# Patient Record
Sex: MALE | Race: BLACK OR AFRICAN AMERICAN | Employment: UNEMPLOYED | URBAN - METROPOLITAN AREA
[De-identification: names, ages, dates, MRNs, and addresses within clinical notes are randomized per-mention and may not be internally consistent; named-entity substitution may affect disease eponyms.]

---

## 2018-06-07 ENCOUNTER — OFFICE VISIT (OUTPATIENT)
Dept: UROLOGY | Age: 58
End: 2018-06-07

## 2018-06-07 VITALS
SYSTOLIC BLOOD PRESSURE: 151 MMHG | OXYGEN SATURATION: 96 % | HEIGHT: 76 IN | BODY MASS INDEX: 29.47 KG/M2 | HEART RATE: 65 BPM | DIASTOLIC BLOOD PRESSURE: 80 MMHG | WEIGHT: 242 LBS

## 2018-06-07 DIAGNOSIS — R35.0 FREQUENT URINATION: Primary | ICD-10-CM

## 2018-06-07 DIAGNOSIS — N52.01 ERECTILE DYSFUNCTION DUE TO ARTERIAL INSUFFICIENCY: ICD-10-CM

## 2018-06-07 DIAGNOSIS — R35.1 NOCTURIA: ICD-10-CM

## 2018-06-07 LAB
BILIRUB UR QL STRIP: NEGATIVE
GLUCOSE UR-MCNC: NEGATIVE MG/DL
KETONES P FAST UR STRIP-MCNC: NEGATIVE MG/DL
PH UR STRIP: 6 [PH] (ref 4.6–8)
PROT UR QL STRIP: NEGATIVE
SP GR UR STRIP: 1.02 (ref 1–1.03)
UA UROBILINOGEN AMB POC: NORMAL (ref 0.2–1)
URINALYSIS CLARITY POC: CLEAR
URINALYSIS COLOR POC: YELLOW
URINE BLOOD POC: NEGATIVE
URINE LEUKOCYTES POC: NEGATIVE
URINE NITRITES POC: NEGATIVE

## 2018-06-07 RX ORDER — BUPRENORPHINE AND NALOXONE 8; 2 MG/1; MG/1
1 FILM, SOLUBLE BUCCAL; SUBLINGUAL 2 TIMES DAILY
COMMUNITY
End: 2021-10-22

## 2018-06-07 RX ORDER — TAMSULOSIN HYDROCHLORIDE 0.4 MG/1
0.4 CAPSULE ORAL DAILY
Qty: 30 CAP | Refills: 0 | Status: SHIPPED | OUTPATIENT
Start: 2018-06-07 | End: 2018-08-04 | Stop reason: SDUPTHER

## 2018-06-07 RX ORDER — SILDENAFIL 100 MG/1
100 TABLET, FILM COATED ORAL AS NEEDED
Qty: 30 TAB | Refills: 10 | Status: SHIPPED | OUTPATIENT
Start: 2018-06-07 | End: 2021-02-04

## 2018-06-07 NOTE — MR AVS SNAPSHOT
[facility] [address] [address] 
[phone] Patient: Fernando Leary MRN: [id number] KEB:[birth year] Visit Information Date & Time Provider Department Dept. Phone Encounter #  
 6/7/2018 11:15 AM Cody Ledbetter [facility] [phone] [id number] Your Appointments 6/28/2018 11:15 AM  
Office Visit with Cameron Torres MD  
[facility] [address] [place] [address]) Appt Note: checkup [address] [place]amed A [address] [address]  
[phone] [address] [address] Upcoming Health Maintenance Date Due Hepatitis C Screening 1960 DTaP/Tdap/Td series (1 - Tdap) 7/23/1980 FOBT Q 1 YEAR AGE 50-75 7/23/2009 Influenza Age 5 to Adult 8/1/2018 Allergies as of 6/7/2018  Review Complete On: 6/7/2018 By: Tania Strauss No Known Allergies Current Immunizations  Never Reviewed No immunizations on file. Not reviewed this visit You Were Diagnosed With   
  
 Codes Comments Frequent urination    -  Primary ICD-10-CM: R35.0 ICD-9-CM: 788.41 Erectile dysfunction, unspecified erectile dysfunction type     ICD-10-CM: N52.9 ICD-9-CM: 607.84 Nocturia     ICD-10-CM: R35.1 ICD-9-CM: 788.43 Vitals BP Pulse Height(growth percentile) Weight(growth percentile) SpO2 BMI  
 151/80 (BP 1 Location: Left arm, BP Patient Position: Sitting) 65 6' 4\" (1.93 m) 242 lb (109.8 kg) 96% 29.46 kg/m2 Smoking Status Never Smoker Vitals History BMI and BSA Data Body Mass Index Body Surface Area  
 29.46 kg/m 2 2.43 m 2 Preferred Pharmacy Pharmacy Name Phone [id number], [phone] [place] [phone] Your Updated Medication List  
  
   
This list is accurate as of 6/7/18 12:46 PM.  Always use your most recent med list.  
  
  
 ibuprofen 600 mg tablet Commonly known as:  MOTRIN Take 1 Tab by mouth every six (6) hours as needed for Pain. lactobacillus rhamnosus gg 10 billion cell 10 billion cell capsule Commonly known as:  PROBIOTIC Take 1 Cap by mouth daily. oxyCODONE-acetaminophen 7.5-325 mg per tablet Commonly known as:  PERCOCET Take 1 Tab by mouth every six (6) hours as needed for Pain. Max Daily Amount: 4 Tabs.  
  
 sildenafil citrate 100 mg tablet Commonly known as:  VIAGRA Take 1 Tab by mouth as needed. Indications: Erectile Dysfunction SUBOXONE 8-2 mg Film sublingaul film Generic drug:  buprenorphine-naloxone  
by SubLINGual route daily. Prescriptions Printed Refills  
 sildenafil citrate (VIAGRA) 100 mg tablet 10 Sig: Take 1 Tab by mouth as needed. Indications: Erectile Dysfunction Class: Print Route: Oral  
  
We Performed the Following AMB POC URINALYSIS DIP STICK AUTO W/O MICRO [95466 CPT(R)] ND SCOTT,POST-VOID RES,US,NON-IMAGING [id number] CPT(R)] Introducing [facility]! Bettina Sheffield introduces [employer] patient portal. Now you can access parts of your medical record, email your doctor's office, and request medication refills online. 1. In your internet browser, go to [email or web address] 2. Click on the First Time User? Click Here link in the Sign In box. You will see the New Member Sign Up page. 3. Enter your [employer] Access Code exactly as it appears below. You will not need to use this code after youve completed the sign-up process. If you do not sign up before the expiration date, you must request a new code. · [employer] Access Code: [email or web address] Expires: 9/5/2018 11:45 AM 
 
4. Enter the last four digits of your Social Security Number (xxxx) and Date of Birth (mm/dd/yyyy) as indicated and click Submit. You will be taken to the next sign-up page. 5. Create a [employer] ID. This will be your [employer] login ID and cannot be changed, so think of one that is secure and easy to remember. 6. Create a [employer] password. You can change your password at any time. 7. Enter your Password Reset Question and Answer. This can be used at a later time if you forget your password. 8. Enter your e-mail address. You will receive e-mail notification when new information is available in [address] 9. Click Sign Up. You can now view and download portions of your medical record. 10. Click the Download Summary menu link to download a portable copy of your medical information. If you have questions, please visit the Frequently Asked Questions section of the [employer] website. Remember, [employer] is NOT to be used for urgent needs. For medical emergencies, dial 911. Now available from your iPhone and Android! Please provide this summary of care documentation to your next provider. Your primary care clinician is listed as NONE. If you have any questions after today's visit, please call [phone].

## 2018-06-07 NOTE — PROGRESS NOTES
MrGeraldine Davenport has a reminder for a \"due or due soon\" health maintenance. I have asked that he contact his primary care provider for follow-up on this health maintenance.

## 2018-06-07 NOTE — PROGRESS NOTES
Chief Complaint   Patient presents with    New Patient    Urinary Frequency    Nocturia    Erectile Dysfunction       HISTORY OF PRESENT ILLNESS:  Choco Wang is a 61 y.o. male who presents today with a history going back couple of years at least of very pronounced urinary frequency and especially worsening nocturia which is currently at anywhere from 2-4 times nightly. He has no episodes of incontinence but he does have severe urgency and actually carries a cup around with him in the car. He has not had any infections, hematuria, episodes of retention and he has not on any medication for that. His other chief complaint is also that of significant erectile dysfunction which she has had for about a year or so. He is able to have erections to the point of intercourse but not very often. He states that his father who is 80 and is still active as a  let him try 1 of his (dad's) Viagra and it worked very well. Documented on the chart. He has no systemic or metabolic illnesses that would predispose him to erectile dysfunction other than some mild degenerative back disease. ROS    Past Medical History:   Diagnosis Date    Back pain     Back problem        Past Surgical History:   Procedure Laterality Date    HX ORTHOPAEDIC      bilateral shoulder surgery    HX ORTHOPAEDIC      cyst removed from left wrist       Social History   Substance Use Topics    Smoking status: Never Smoker    Smokeless tobacco: Never Used    Alcohol use No       No Known Allergies    Family History   Problem Relation Age of Onset    Family history unknown: Yes       Current Outpatient Prescriptions   Medication Sig Dispense Refill    buprenorphine-naloxone (SUBOXONE) 8-2 mg film sublingaul film by SubLINGual route daily.  sildenafil citrate (VIAGRA) 100 mg tablet Take 1 Tab by mouth as needed.  Indications: Erectile Dysfunction 30 Tab 10    tamsulosin (FLOMAX) 0.4 mg capsule Take 1 Cap by mouth daily. 30 Cap 0    oxyCODONE-acetaminophen (PERCOCET) 7.5-325 mg per tablet Take 1 Tab by mouth every six (6) hours as needed for Pain. Max Daily Amount: 4 Tabs. 20 Tab 0    ibuprofen (MOTRIN) 600 mg tablet Take 1 Tab by mouth every six (6) hours as needed for Pain. 20 Tab 0    lactobacillus rhamnosus gg 10 billion cell (PROBIOTIC) 10 billion cell capsule Take 1 Cap by mouth daily. 14 Cap 0         PHYSICAL EXAMINATION:   Visit Vitals    /80 (BP 1 Location: Left arm, BP Patient Position: Sitting)    Pulse 65    Ht 6' 4\" (1.93 m)    Wt 242 lb (109.8 kg)    SpO2 96%    BMI 29.46 kg/m2     Constitutional: WDWN, Pleasant and appropriate affect, No acute distress. CV:  No peripheral swelling noted  Respiratory: No respiratory distress or difficulties  Abdomen:  No abdominal masses or tenderness. No CVA tenderness. No inguinal hernias noted.  Male:    ALLY:Perineum normal to visual inspection, no erythema or irritation, Sphincter with good tone, Rectum with no hemorrhoids, fissures or masses, the prostate seems very small for his age but on the other hand he is extremely so tense that I really could not get a good evaluation of the entire prostate. SCROTUM:  No scrotal rash or lesions noticed. Normal bilateral testes and epididymis. PENIS: Urethral meatus normal in location and size. No urethral discharge. He is uncircumcised and I do not feel any plaques of Peyronie's disease. Skin: No jaundice. Neuro/Psych:  Alert and oriented x 3, affect appropriate. Lymphatic:   No enlarged inguinal lymph nodes.          Results for orders placed or performed in visit on 06/07/18   AMB POC URINALYSIS DIP STICK AUTO W/O MICRO   Result Value Ref Range    Color (UA POC) Yellow     Clarity (UA POC) Clear     Glucose (UA POC) Negative Negative    Bilirubin (UA POC) Negative Negative    Ketones (UA POC) Negative Negative    Specific gravity (UA POC) 1.025 1.001 - 1.035    Blood (UA POC) Negative Negative pH (UA POC) 6.0 4.6 - 8.0    Protein (UA POC) Negative Negative    Urobilinogen (UA POC) 0.2 mg/dL 0.2 - 1    Nitrites (UA POC) Negative Negative    Leukocyte esterase (UA POC) Negative Negative     Bladder scan shows less than 30 cc residual.    REVIEW OF LABS AND IMAGING:     Imaging Report Reviewed? YES     Images Reviewed? YES           Other Lab Data Reviewed? YES         ASSESSMENT:     ICD-10-CM ICD-9-CM    1. Frequent urination R35.0 788.41 AMB POC URINALYSIS DIP STICK AUTO W/O MICRO      DC SCOTT,POST-VOID RES,US,NON-IMAGING      tamsulosin (FLOMAX) 0.4 mg capsule   2. Erectile dysfunction due to arterial insufficiency N52.01 607.84 AMB POC URINALYSIS DIP STICK AUTO W/O MICRO   3. Nocturia R35.1 788.43 AMB POC URINALYSIS DIP STICK AUTO W/O MICRO      DC SCOTT,POST-VOID RES,US,NON-IMAGING      tamsulosin (FLOMAX) 0.4 mg capsule            PLAN / DISCUSSION: : I think he probably does have some bladder outlet obstructive issues although probably not significant enough to cause elevated residual.  For that reason, I am going to empirically put him on some Flomax for about a month and see how he does with that. We then had a lengthy talk about the treatment of his erection issues which I think could probably be corrected with Viagra. I have written him a prescription for Viagra along with the store numbers to have that filled. I am also going to get a urinary cytology although he is a non-smoker because of his dysuria and urinary discomfort. The patient expresses understanding and agreement of the discussion and plan. Leo Arora MD on 6/7/2018         Please note: This document has been produced using voice recognition software. Unrecognized errors in transcription may be present.

## 2018-06-11 LAB — CYTOLOGY URINE,28053: NORMAL

## 2018-08-04 DIAGNOSIS — R35.0 FREQUENT URINATION: ICD-10-CM

## 2018-08-04 DIAGNOSIS — R35.1 NOCTURIA: ICD-10-CM

## 2018-08-07 RX ORDER — TAMSULOSIN HYDROCHLORIDE 0.4 MG/1
CAPSULE ORAL
Qty: 30 CAP | Refills: 0 | Status: SHIPPED | OUTPATIENT
Start: 2018-08-07 | End: 2021-09-23 | Stop reason: SDUPTHER

## 2019-01-29 ENCOUNTER — OFFICE VISIT (OUTPATIENT)
Dept: UROLOGY | Age: 59
End: 2019-01-29

## 2019-01-29 VITALS
SYSTOLIC BLOOD PRESSURE: 115 MMHG | HEIGHT: 76 IN | BODY MASS INDEX: 26.3 KG/M2 | DIASTOLIC BLOOD PRESSURE: 64 MMHG | WEIGHT: 216 LBS | HEART RATE: 64 BPM | OXYGEN SATURATION: 95 %

## 2019-01-29 DIAGNOSIS — R35.0 URINARY FREQUENCY: Primary | ICD-10-CM

## 2019-01-29 DIAGNOSIS — Z12.5 PROSTATE CANCER SCREENING: ICD-10-CM

## 2019-01-29 LAB
BILIRUB UR QL STRIP: NEGATIVE
GLUCOSE UR-MCNC: NEGATIVE MG/DL
KETONES P FAST UR STRIP-MCNC: NEGATIVE MG/DL
PH UR STRIP: 6 [PH] (ref 4.6–8)
PROT UR QL STRIP: NEGATIVE
SP GR UR STRIP: 1.01 (ref 1–1.03)
UA UROBILINOGEN AMB POC: NORMAL (ref 0.2–1)
URINALYSIS CLARITY POC: CLEAR
URINALYSIS COLOR POC: YELLOW
URINE BLOOD POC: NEGATIVE
URINE LEUKOCYTES POC: NORMAL
URINE NITRITES POC: NEGATIVE

## 2019-01-29 RX ORDER — TAMSULOSIN HYDROCHLORIDE 0.4 MG/1
CAPSULE ORAL
Qty: 60 CAP | Refills: 11 | Status: SHIPPED | OUTPATIENT
Start: 2019-01-29 | End: 2019-04-12

## 2019-01-29 NOTE — PROGRESS NOTES
NARRATIVE: 
he is a pleasant 58-year-old -American male who is known to this office with a single first visit in June 2018. The patient came in with frequency urgency and nocturia without incontinence. The patient has also been consuming a significant amount of coffee. The patient was given Flomax and it worked very well but the patient ran out and did not get refills. The patient also has ongoing concerns because the use of Viagra is still somewhat uncertain. The patient is able to get an erection but not sustain the erection. The patient's father has prostate cancer but the patient himself is not getting PSA determinations Bladder scan reveals 118 cc residual urine with the patient being off Flomax. Urine is negative IMPRESSION: 
Recurrence of urinary frequency that was well controlled on Flomax Erectile dysfunction probable venous leak phenomenon Family history prostate cancer PLAN: 
I am going to refill the Flomax and will write to increase it to 2.8 mg/day after food I discussed the use of a venous constriction ring and the proper consumption of Viagra to maximize effectiveness ASA will be drawn and I will call that patient with that report and he is strongly advised that he needs to get into the habit of yearly rectal exam and PSA This visit exceeded 15 minutes and  >50% was discussion Patient expresses understanding of the treatment plan and wishes to proceed This dictation used voice recognition software and there may be mistakes.  
 
Victorino Freeman MD

## 2019-01-30 ENCOUNTER — DOCUMENTATION ONLY (OUTPATIENT)
Dept: UROLOGY | Age: 59
End: 2019-01-30

## 2019-01-30 ENCOUNTER — TELEPHONE (OUTPATIENT)
Dept: UROLOGY | Age: 59
End: 2019-01-30

## 2019-01-30 LAB — PSA SERPL-MCNC: 7 NG/ML (ref 0–4)

## 2019-01-30 NOTE — TELEPHONE ENCOUNTER
I called  Thor Springer per Dr. Lily Davis. I informed . Thor Springer to not have his Viagra pres filled because Dr. Lily Davis wants him to return to the office for discussions regarding his PSA. Patient was given an appointment for !-31-19 AT 3:30PM.  Patient understood and  agreed.

## 2019-01-31 ENCOUNTER — OFFICE VISIT (OUTPATIENT)
Dept: UROLOGY | Age: 59
End: 2019-01-31

## 2019-01-31 ENCOUNTER — DOCUMENTATION ONLY (OUTPATIENT)
Dept: UROLOGY | Age: 59
End: 2019-01-31

## 2019-01-31 VITALS
HEART RATE: 79 BPM | WEIGHT: 216 LBS | DIASTOLIC BLOOD PRESSURE: 53 MMHG | BODY MASS INDEX: 26.3 KG/M2 | HEIGHT: 76 IN | SYSTOLIC BLOOD PRESSURE: 100 MMHG | OXYGEN SATURATION: 94 %

## 2019-01-31 DIAGNOSIS — R97.20 ELEVATED PSA: Primary | ICD-10-CM

## 2019-01-31 RX ORDER — CIPROFLOXACIN 500 MG/1
TABLET ORAL
Qty: 3 TAB | Refills: 0 | Status: SHIPPED | OUTPATIENT
Start: 2019-01-31 | End: 2019-04-12

## 2019-01-31 RX ORDER — SULFAMETHOXAZOLE AND TRIMETHOPRIM 800; 160 MG/1; MG/1
TABLET ORAL
Qty: 3 TAB | Refills: 0 | Status: SHIPPED | OUTPATIENT
Start: 2019-01-31 | End: 2019-04-12

## 2019-01-31 NOTE — PROGRESS NOTES
Mr. Joselo Hurley has a reminder for a \"due or due soon\" health maintenance. I have asked that he contact his primary care provider for follow-up on this health maintenance.

## 2019-01-31 NOTE — PATIENT INSTRUCTIONS
Prostate-Specific Antigen (PSA) Test: About This Test  What is it? A prostate-specific antigen (PSA) test measures the amount of PSA in your blood. PSA is released by a man's prostate gland into his blood. A high PSA level may mean that you have an enlargement, infection, or cancer of the prostate. Why is this test done? You may have this test to:  · Check for prostate cancer. · Watch prostate cancer and see if treatment is working. How can you prepare for the test?  Do not ejaculate during the 2 days before your PSA blood test, either during sex or masturbation. What happens before the test?  Tell your doctor if you have had a:  · Test to look at your bladder (cystoscopy) in the past several weeks. · Prostate biopsy in the past several weeks. · Prostate infection or urinary tract infection that has not gone away. · Tube (catheter) inserted into your bladder to drain urine recently. What happens during the test?  A health professional takes a sample of your blood. What happens after the test?  You can go back to your usual activities right away. When should you call for help? Watch closely for changes in your health, and be sure to contact your doctor if you have any questions about this test.  Follow-up care is a key part of your treatment and safety. Be sure to make and go to all appointments, and call your doctor if you are having problems. It's also a good idea to keep a list of the medicines you take. Ask your doctor when you can expect to have your test results. Where can you learn more? Go to http://cosme-celine.info/. Enter M290 in the search box to learn more about \"Prostate-Specific Antigen (PSA) Test: About This Test.\"  Current as of: March 27, 2018  Content Version: 11.9  © 2634-0313 [employer]. Care instructions adapted under license by [employer] (which disclaims liability or warranty for this information).  If you have questions about a medical condition or this instruction, always ask your healthcare professional. [place] [address] any warranty or liability for your use of this information.

## 2019-01-31 NOTE — PROGRESS NOTES
NARRATIVE:  The patient is a 58-year-old -American male who comes back to the office of my request because of a PSA elevated at 7.0 with a family history of prostate cancer in the father and no previous PSA surveillance                      IMPRESSION: Elevated PSA and -American male with family history of prostate cancer        PLAN: Extended discussion about PSA and the causes for its elevation. The implications in the -American community especially with a family history is reviewed. I have discussed alternatives with the patient but I believe his purposes are best served by proceeding on with transrectal ultrasound to establish prostate size PSA density and internal anatomy. The patient will be prepared to undergo biopsy if needed. The patient is aware of the preparation technique and convalescence. He is aware that he needs to take his medication as prescribed. The patient is aware of the risks that include, but are not limited to, infection, sepsis, bleeding, finding of cancer, failure to diagnose, and possible need for additional procedures      This visit exceeded 15 minutes and greater than 50% was counseling. The patient expresses understanding of the treatment plan and wishes to proceed    This dictation used voice recognition software and there may be mistakes.     Jesica Gamino MD

## 2019-01-31 NOTE — PROGRESS NOTES
Dr. Mekhi Marinelli had given  a prescription for Sildenafil 100 mg to [facility] to [facility] . He had taken the script to [facility] and it needed a Authorization , . Then he had taken the prescription to [facility]. I called the Pharmacy they said they don't have the medication and could not get it that all [facility] do not supply this medication . I called his Insurance for a Authorization they said it was not covered. I told her He has already called and his insurance company said it was covered. I was then given another number to call to speak to a other Rep. She told me it is covered under his plan and tell the Pharmacy to us [phone] as a NDC number . [facility] they said they had the medication so I called Mr. Destiny Gallegos to tell him [facility] was unable to fill his medication because they don\"T cover this drug at all. I told him [facility] has this drug and ask him if he wanted me to call this in for him he said yes. I called [facility] back she tried to run the medication with the [facility] number that was given to me but something was wrong with his insurance card, I told her I would have to call her back. I called Mr. Destiny Gallegos back to see if he had a drug card ,he said he had a saving's card. I did not call the pharmacy back because at that time Dr. Mekhi Marinelli came in . His blood work had come back in and his PSA was elevated . Dr. Mekhi Marinelli said he needs to come in to see him and put the Sildenafil on hold until he come's in the office for a talk. Appointment was given ,he will be in today for follow up. Susi Hunt

## 2019-02-07 ENCOUNTER — DOCUMENTATION ONLY (OUTPATIENT)
Dept: UROLOGY | Age: 59
End: 2019-02-07

## 2019-02-07 NOTE — PROGRESS NOTES
Mr. Will Connolly call to see if their is any thing else we can give him foe ED . I told her I would have to talk with Dr. Pro Vargas and call he back. I returned her call after speaking with Dr. Pro Vargas he said we are done with this situation . We have spent a lot of time with this Patient and his insurance company. She told me he had paid 58 dollars out of pocket and told her we are sorry but their was nothing liu we were going to do. She also told me this medication will not be paid through his insurance company.

## 2019-02-12 ENCOUNTER — DOCUMENTATION ONLY (OUTPATIENT)
Dept: UROLOGY | Age: 59
End: 2019-02-12

## 2019-02-12 NOTE — PROGRESS NOTES
Mr. Ben Rush called and wanted a refill on his  Viagra . He filled one prescription on January 30 for 20 tabs . I called and told him he has 11 refills. He said he did not know.

## 2019-04-12 PROBLEM — F11.21 NARCOTIC DEPENDENCE, IN REMISSION (HCC): Status: ACTIVE | Noted: 2019-04-12

## 2022-06-09 ENCOUNTER — APPOINTMENT (OUTPATIENT)
Dept: GENERAL RADIOLOGY | Age: 62
End: 2022-06-09
Attending: PHYSICIAN ASSISTANT
Payer: MEDICAID

## 2022-06-09 ENCOUNTER — HOSPITAL ENCOUNTER (EMERGENCY)
Age: 62
Discharge: HOME | End: 2022-06-09
Attending: STUDENT IN AN ORGANIZED HEALTH CARE EDUCATION/TRAINING PROGRAM
Payer: MEDICAID

## 2022-06-09 VITALS
HEIGHT: 75 IN | HEART RATE: 57 BPM | OXYGEN SATURATION: 99 % | BODY MASS INDEX: 26.36 KG/M2 | RESPIRATION RATE: 18 BRPM | SYSTOLIC BLOOD PRESSURE: 104 MMHG | TEMPERATURE: 98.1 F | WEIGHT: 212 LBS | DIASTOLIC BLOOD PRESSURE: 60 MMHG

## 2022-06-09 DIAGNOSIS — M79.631 RIGHT FOREARM PAIN: Primary | ICD-10-CM

## 2022-06-09 DIAGNOSIS — M79.641 RIGHT HAND PAIN: ICD-10-CM

## 2022-06-09 PROCEDURE — 99283 EMERGENCY DEPT VISIT LOW MDM: CPT

## 2022-06-09 PROCEDURE — 73130 X-RAY EXAM OF HAND: CPT

## 2022-06-09 PROCEDURE — 73090 X-RAY EXAM OF FOREARM: CPT

## 2022-06-09 RX ORDER — NAPROXEN 500 MG/1
500 TABLET ORAL 2 TIMES DAILY WITH MEALS
Qty: 20 TABLET | Refills: 0 | Status: SHIPPED | OUTPATIENT
Start: 2022-06-09 | End: 2022-06-19

## 2022-06-09 RX ORDER — BUPRENORPHINE AND NALOXONE 8; 2 MG/1; MG/1
FILM, SOLUBLE BUCCAL; SUBLINGUAL 2 TIMES DAILY
COMMUNITY

## 2022-06-09 NOTE — ED PROVIDER NOTES
EMERGENCY DEPARTMENT HISTORY AND PHYSICAL EXAM    Date: 6/9/2022  Patient Name: Rosanna Kilgore    History of Presenting Illness     Chief Complaint   Patient presents with    Arm Pain     right     Hand Pain     right          History Provided By: Patient    Chief Complaint: Right wrist and forearm pain  Duration: 2 months  Timing: Intermittent  Location: See above  Quality: Shooting pain  Severity: Moderate  Modifying Factors: Worse when trying to pick something up  Associated Symptoms: none       Additional History (Context): Rosanna Kilgore is a 57 y.o. male with a history of hypertension and prostate cancer who presents today for issues listed above. Patient reports that he is a retired  and still works out daily. Reports he does over thousand push-ups a day. Thinks this may be part of his pain. Denies any recent trauma or injury. Denies any history of pain like this in the past.  Denies any history of injury or surgeries to this area. Has not tried thing for this at home. Today is his first evaluation for this. PCP: Melinda, MD Carly    Current Outpatient Medications   Medication Sig Dispense Refill    buprenorphine-naloxone (Suboxone) 8-2 mg film sublingaul film by SubLINGual route two (2) times a day.  naproxen (Naprosyn) 500 mg tablet Take 1 Tablet by mouth two (2) times daily (with meals) for 10 days.  20 Tablet 0    tamsulosin (FLOMAX) 0.4 mg capsule TAKE 1 CAPSULE BY MOUTH TWICE A DAY 60 Capsule 0       Past History     Past Medical History:  Past Medical History:   Diagnosis Date    Back pain     Back problem     BPH with obstruction/lower urinary tract symptoms     Elevated PSA     HTN (hypertension)     Prostate cancer ([facility] [place] [address]     Urinary retention        Past Surgical History:  Past Surgical History:   Procedure Laterality Date    HX ORTHOPAEDIC      bilateral shoulder surgery    HX ORTHOPAEDIC      cyst removed from left wrist    HX ROTATOR CUFF REPAIR Family History:  Family History   Family history unknown: Yes       Social History:  Social History     Tobacco Use    Smoking status: Never Smoker    Smokeless tobacco: Never Used   Substance Use Topics    Alcohol use: No    Drug use: No       Allergies: Allergies   Allergen Reactions    Iodinated Contrast Media Other (comments)         Review of Systems   Review of Systems   Constitutional: Negative for chills and fever. HENT: Negative for congestion, rhinorrhea and sore throat. Respiratory: Negative for cough and shortness of breath. Cardiovascular: Negative for chest pain. Gastrointestinal: Negative for abdominal pain, blood in stool, constipation, diarrhea, nausea and vomiting. Genitourinary: Negative for dysuria, frequency and hematuria. Musculoskeletal: Positive for arthralgias. Negative for back pain and myalgias. Skin: Negative for rash and wound. Neurological: Negative for dizziness and headaches. All other systems reviewed and are negative. All Other Systems Negative  Physical Exam     Vitals:    06/09/22 0727 06/09/22 0729   BP:  104/60   Pulse: (!) 57    Resp: 18    Temp: 98.1 °F (36.7 °C)    SpO2: 99%    Weight: 96.2 kg (212 lb)    Height: 6' 3\" (1.905 m)      Physical Exam  Vitals and nursing note reviewed. Constitutional:       General: He is not in acute distress. Appearance: He is well-developed. He is not diaphoretic. Comments: Well-appearing   HENT:      Head: Normocephalic and atraumatic. Eyes:      Conjunctiva/sclera: Conjunctivae normal.   Cardiovascular:      Rate and Rhythm: Normal rate and regular rhythm. Heart sounds: Normal heart sounds. Pulmonary:      Effort: Pulmonary effort is normal. No respiratory distress. Breath sounds: Normal breath sounds. Chest:      Chest wall: No tenderness. Abdominal:      General: Bowel sounds are normal. There is no distension. Palpations: Abdomen is soft. Tenderness:  There is no abdominal tenderness. There is no guarding or rebound. Musculoskeletal:         General: No deformity. Normal range of motion. Right wrist: Normal. No tenderness, bony tenderness or snuff box tenderness. Normal range of motion. Normal pulse. Right hand: Normal. No deformity, lacerations or tenderness. Normal sensation. Cervical back: Normal range of motion and neck supple. Skin:     General: Skin is warm and dry. Neurological:      Mental Status: He is alert and oriented to person, place, and time. Diagnostic Study Results     Labs -   No results found for this or any previous visit (from the past 12 hour(s)). Radiologic Studies -   XR HAND RT MIN 3 V   Final Result   1. No acute osseous findings. XR FOREARM RT AP/LAT   Final Result   1. No acute osseous findings. CT Results  (Last 48 hours)    None        CXR Results  (Last 48 hours)    None            Medical Decision Making   I am the first provider for this patient. I reviewed the vital signs, available nursing notes, past medical history, past surgical history, family history and social history. Vital Signs-Reviewed the patient's vital signs. Records Reviewed: Nursing Notes and Old Medical Records     Procedures: None   Procedures    Provider Notes (Medical Decision Making):     Differential: fracture, dislocation, abrasion, sprain, contusion, laceration, OA, RA, gout, carpal tunnel syndrome      Plan: Will order xrays     9:28 AM  I discussed reassuring imaging with patient. Did discuss concerns for carpal tunnel syndrome and will plan to discharge home with a wrist splint to sleep and at night and Naprosyn. Have discussed with patient that if his pain continues he will need to follow-up with hand surgery. Patient states understanding and agrees with this plan. Will discharge home. MED RECONCILIATION:  No current facility-administered medications for this encounter.      Current Outpatient Medications   Medication Sig    buprenorphine-naloxone (Suboxone) 8-2 mg film sublingaul film by SubLINGual route two (2) times a day.  naproxen (Naprosyn) 500 mg tablet Take 1 Tablet by mouth two (2) times daily (with meals) for 10 days.  tamsulosin (FLOMAX) 0.4 mg capsule TAKE 1 CAPSULE BY MOUTH TWICE A DAY       Disposition:  Home     DISCHARGE NOTE:   Pt has been reexamined. Patient has no new complaints, changes, or physical findings. Care plan outlined and precautions discussed. Results of workup were reviewed with the patient. All medications were reviewed with the patient. All of pt's questions and concerns were addressed. Patient was instructed and agrees to follow up with PCP/hand surgery as well as to return to the ED upon further deterioration. Patient is ready to go home. Follow-up Information     Follow up With Specialties Details Why Contact Info    [facility] EMERGENCY DEPT Emergency Medicine  As needed [address] [facility] [id number]  [address] [place] [address], [address] [place] [address] Ne, DO Orthopedic Surgery, Hand Surgery Physician Schedule an appointment as soon as possible for a visit   [address] [facility]  [phone]            Current Discharge Medication List      START taking these medications    Details   naproxen (Naprosyn) 500 mg tablet Take 1 Tablet by mouth two (2) times daily (with meals) for 10 days. Qty: 20 Tablet, Refills: 0  Start date: 6/9/2022, End date: 6/19/2022                 Diagnosis     Clinical Impression:   1. Right forearm pain    2. Right hand pain          \"Please note that this dictation was completed with [employer], the [employer] voice recognition software. Quite often unanticipated grammatical, syntax, homophones, and other interpretive errors are inadvertently transcribed by the computer software. Please disregard these errors. Please excuse any errors that have escaped final proofreading. \"

## 2022-06-09 NOTE — Clinical Note
[address] [place] [address] Dr [facility] EMERGENCY DEPT  [id number] [facility] [address] [phone]    Work/School Note    Date: 6/9/2022    To Whom It May concern:    Rosanna Hancock was seen and treated today in the emergency room by the following provider(s):  Attending Provider: Isabella Mcbride MD  Physician Assistant: MANISHA Edge. Rosanna Hancock is excused from work/school on 06/09/22 and 06/10/22. He is medically clear to return to work/school on 6/11/2022.        Sincerely,          MANISHA Vora

## 2022-06-09 NOTE — Clinical Note
[address] [place] [address] [address] [facility] EMERGENCY DEPT  [id number] [facility] [address] [phone]    Work/School Note    Date: 6/9/2022    To Whom It May concern:    Aisha Mahmood was seen and treated today in the emergency room by the following provider(s):  Attending Provider: Don Gilbert MD  Physician Assistant: MANISHA Logan. Aisha Mahmood is excused from work/school on 06/09/22 and 06/10/22. He is medically clear to return to work/school on 6/11/2022.        Sincerely,          MANISHA Darling

## 2022-06-09 NOTE — ED TRIAGE NOTES
Pt reports right forearm and hand pain times 2 months.   Pt denies any specific injury and states this morning he went to pick something up off of the floor and felt the pain radiate up his arm